# Patient Record
Sex: FEMALE | Race: WHITE | NOT HISPANIC OR LATINO | Employment: FULL TIME | URBAN - METROPOLITAN AREA
[De-identification: names, ages, dates, MRNs, and addresses within clinical notes are randomized per-mention and may not be internally consistent; named-entity substitution may affect disease eponyms.]

---

## 2021-04-06 DIAGNOSIS — Z23 ENCOUNTER FOR IMMUNIZATION: ICD-10-CM

## 2021-05-12 ENCOUNTER — TELEPHONE (OUTPATIENT)
Dept: NEUROLOGY | Facility: CLINIC | Age: 31
End: 2021-05-12

## 2021-05-12 ENCOUNTER — OFFICE VISIT (OUTPATIENT)
Dept: NEUROSURGERY | Facility: CLINIC | Age: 31
End: 2021-05-12
Payer: COMMERCIAL

## 2021-05-12 ENCOUNTER — TRANSCRIBE ORDERS (OUTPATIENT)
Dept: NEUROSURGERY | Facility: CLINIC | Age: 31
End: 2021-05-12

## 2021-05-12 VITALS
DIASTOLIC BLOOD PRESSURE: 74 MMHG | HEIGHT: 64 IN | TEMPERATURE: 98.4 F | RESPIRATION RATE: 16 BRPM | WEIGHT: 149 LBS | SYSTOLIC BLOOD PRESSURE: 110 MMHG | HEART RATE: 65 BPM | BODY MASS INDEX: 25.44 KG/M2

## 2021-05-12 DIAGNOSIS — Q28.3 CEREBRAL CAVERNOUS MALFORMATION: Primary | ICD-10-CM

## 2021-05-12 DIAGNOSIS — R90.0 INTRACRANIAL MASS: Primary | ICD-10-CM

## 2021-05-12 PROCEDURE — 99204 OFFICE O/P NEW MOD 45 MIN: CPT | Performed by: NEUROLOGICAL SURGERY

## 2021-05-12 NOTE — PROGRESS NOTES
Patient Id: Aroldo Moon is a 26 y o  female        Handedness: Right      Assessment/Plan:    Diagnoses and all orders for this visit:    Cerebral cavernous malformation  -     Ambulatory referral to Neurosurgery  -     Ambulatory referral to Neurology; Future  -     MRI brain with and without contrast; Future        Discussion Summary:   1  Right parietal occipital cavernous malformation, less than 1 cm  We discussed the diagnosis of cerebral cavernous malformations as well as the context of other vascular malformations  There is hemosiderin around this likely cavernous malformation without any evidence of acute hemorrhage  There is no significant mass effect  MRA negative for vascular lesion  Ultimately I believe that this is incidental to her symptoms  It does not explain her "tilting vision" or headaches  Given her age and new diagnosis of this lesion would like to repeat an MRI in 6 months with and without contrast to ensure stability  We discussed the surgical indications for resection including repeated hemorrhage and uncontrolled seizures  There is no evidence for either of these  2  Family history of aneurysm/subarachnoid hemorrhage in her grandmother  We discussed the natural history and diagnosis of aneurysms  Her MRA is negative at this time for intracranial aneurysm or vascular malformation  We discussed that aneurysm formation is a dynamic process  Negative MRA at this juncture does not preclude aneurysm formation in the future  I typically screened patient's once a decade  She can repeat an MRA in her 45s for further assessment  Chief Complaint: Consult      HPI:   This is a very pleasant 29-year-old female who has had intermittent symptoms of visual disturbance where she states that the vision is sliding from right to left  Sometimes she gets headaches and head pain which most recently can last up to 5 minutes  She denies any other neurologic deficit    She denies any significant history of migraines  She denies any other visual symptoms  She denies any black spots are longstanding visual changes  Denies any numbness tingling weakness  Her past medical history significant for Lyme disease  She is currently on doxycycline  No significant past surgical history  She is not allergic to any medications  She is   He has no children  She works as an    She denies any tobacco abuse  She drinks alcohol socially on the weekends  Denies any illicit drug use  Family history is significant for aneurysm in her grandmother  Review of systems obtained by the MA reviewed and updated below  Review of Systems   Constitutional: Negative  HENT: Negative  Eyes: Positive for visual disturbance (with vertigo or migraines)  Respiratory: Positive for shortness of breath (from stress)  Cardiovascular: Negative  Gastrointestinal: Negative  Endocrine: Negative  Genitourinary: Negative  Musculoskeletal: Positive for gait problem (unsteady at times)  Negative for back pain, myalgias and neck pain  Skin: Negative  Allergic/Immunologic: Negative  Neurological: Positive for dizziness (vertigo) and headaches (migraines)  Negative for tremors, seizures, speech difficulty, weakness, light-headedness and numbness  Hematological: Bruises/bleeds easily (bruise)  Psychiatric/Behavioral: Negative  Physical Exam  Vitals:    05/12/21 1117   BP: 110/74   Pulse: 65   Resp: 16   Temp: 98 4 °F (36 9 °C)   She is well appearing  Affect is appropriate  Body mass index is 25 58 kg/m²  Praveen Shawn She is awake alert and oriented  Hearing and vision are grossly intact  Her pupils are equal round reactive to light  Her extraocular movements are intact  Her face is symmetric  Tongue is midline  Facial sensation is intact and symmetric throughout  Shoulder shrug is 5/5  There is no drift or dysmetria       She has full strength in her bilateral upper and lower extremities  She has normal muscle tone muscle bulk  Her biceps reflexes and patellar reflexes are 2+ and symmetric  Riki sign negative bilaterally  Sensation intact to light touch and pinprick throughout  Her gait is normal      Her heart rate is regular  Her breath sounds are clear  2+ radial pulses no carotid bruit  The following portions of the patient's history were reviewed and updated as appropriate: allergies, current medications, past family history, past medical history, past social history, past surgical history and problem list     Active Ambulatory Problems     Diagnosis Date Noted    Cerebral cavernous malformation 05/12/2021     Resolved Ambulatory Problems     Diagnosis Date Noted    No Resolved Ambulatory Problems     No Additional Past Medical History       History reviewed  No pertinent surgical history  No current outpatient medications on file  Results/Data: We reviewed her imaging in detail as well as report

## 2021-05-12 NOTE — TELEPHONE ENCOUNTER
Spoke w Fara at [facility]'s while Pt with her  Best contact number for patient:      All demos confirmed    Emergency Contact name and number:    Referring provider and telephone number:     Dr Ramone Choe Provider Name and if affiliated with [facility]:       Krissy Bates    Reason for Appt:        Cerebral cavernous malformation     Have you seen and followed up with a pediatric Neurologist for this disease in the past?       NX wanted Illa Sidle only  (If yes ok to schedule with Dr Heath Virgen)    Neurology Location patient would like to be seen:     [facility] or [facility]  Order received? Yes                                                 Records Received? NX notes    Have you ever seen another Neurologist?         Not sure - didn't speak to pt - forgot to ask Fara to ask her    Insurance Information    Insurance Name:      [employer] PPO    ID/Policy #:    Secondary Insurance:    ID/Policy#: Workman's Comp/ Accident/ School  Information      Workman's Comp/Accident/School related?             NO    If yes name of Insurance company:    Claim #:    Date of Injury:    Type of Injury:     Name and Telephone Number:    Notes:                   Appointment date:     Tuesday 8/31/21  1300  Patrick      Sent NP HA packet     Put on WL for Erinn Ponkina in [facility] and [facility]

## 2021-05-18 ENCOUNTER — TELEPHONE (OUTPATIENT)
Dept: NEUROLOGY | Facility: CLINIC | Age: 31
End: 2021-05-18

## 2021-05-18 NOTE — TELEPHONE ENCOUNTER
ADD ON - patient is scheduled with Dr Sigird Brambila in [facility] on 5/19 @ 2p - insurance is still in force

## 2021-05-19 ENCOUNTER — CONSULT (OUTPATIENT)
Dept: NEUROLOGY | Facility: CLINIC | Age: 31
End: 2021-05-19
Payer: COMMERCIAL

## 2021-05-19 VITALS
HEIGHT: 64 IN | SYSTOLIC BLOOD PRESSURE: 101 MMHG | DIASTOLIC BLOOD PRESSURE: 59 MMHG | HEART RATE: 63 BPM | BODY MASS INDEX: 26.63 KG/M2 | WEIGHT: 156 LBS

## 2021-05-19 DIAGNOSIS — G43.009 MIGRAINE WITHOUT AURA AND WITHOUT STATUS MIGRAINOSUS, NOT INTRACTABLE: Primary | ICD-10-CM

## 2021-05-19 DIAGNOSIS — G44.85 STABBING HEADACHE: ICD-10-CM

## 2021-05-19 DIAGNOSIS — R42 VERTIGO: ICD-10-CM

## 2021-05-19 PROCEDURE — 99205 OFFICE O/P NEW HI 60 MIN: CPT | Performed by: PSYCHIATRY & NEUROLOGY

## 2021-05-19 NOTE — PATIENT INSTRUCTIONS
Consider ENT referral     Headache/migraine treatment:   Abortive medications (for immediate treatment of a headache): It is ok to take ibuprofen, acetaminophen or naproxen (Advil, Tylenol,  Aleve, Excedrin) if they help your headaches you should limit these to No more than 3 times a week to avoid medication overuse/rebound headaches  Over the counter preventive supplements for headaches/migraines (if you try, try for 3 months straight)  (to take every day to help prevent headaches - not to take at the time of headache):  [x] Magnesium 400mg daily (If any diarrhea or upset stomach, decrease dose  as tolerated)  [x] Riboflavin (Vitamin B2) 400mg daily - try online   (FYI B2 may make your urine bright/neon yellow)    Lifestyle Recommendations:  [x] SLEEP - Maintain a regular sleep schedule: Adults need at least 7-8 hours of uninterrupted a night  Maintain good sleep hygiene:  Going to bed and waking up at consistent times, avoiding excessive daytime naps, avoiding caffeinated beverages in the evening, avoid excessive stimulation in the evening and generally using bed primarily for sleeping  One hour before bedtime would recommend turning lights down lower, decreasing your activity (may read quietly, listen to music at a low volume)  When you get into bed, should eliminate all technology (no texting, emailing, playing with your phone, iPad or tablet in bed)  [x] HYDRATION - Maintain good hydration  Drink  2L of fluid a day (4 typical small water bottles)  [x] DIET - Maintain good nutrition  In particular don't skip meals and try and eat healthy balanced meals regularly  [x] TRIGGERS - Look for other triggers and avoid them: Limit caffeine to 1-2 cups a day or less  Avoid dietary triggers that you have noticed bring on your headaches (this could include aged cheese, peanuts, MSG, aspartame and nitrates)    [x] EXERCISE - physical exercise as we all know is good for you in many ways, and not only is good for your heart, but also is beneficial for your mental health, cognitive health and  chronic pain/headaches  I would encourage at the least 5 days of physical exercise weekly for at least 30 minutes  Education and Follow-up  [x] Please call with any questions or concerns  Of course if any new concerning symptoms go to the emergency department    [x] Follow up if needed

## 2021-05-19 NOTE — PROGRESS NOTES
[address] [facility] Consult  PATIENT:  Roxy Bianchi  MRN:  [id number]  :  1990  DATE OF SERVICE:  2021  REFERRED BY: Pieter Zaldivar MD  PMD: Negin Hammond DO    Assessment/Plan:     Roxy Bianchi is a very pleasant 26 y o  female with a past medical history that includes migraines, cavernous malformation, lyme 2021 (s/p doxycycline for 2 weeks), vertigo referred here for evaluation of headache  My initial evaluation 2021     Migraine without aura and without status migrainosus, not intractable  Stabbing headache  Vertigo/dizziness  She reports a long history of migraines dating back to age 11 although very infrequent severe migraines over the years  Migraines are diffuse pressure and fullness of the head without aura and with typical associated migrainous features  She reports mild headaches in the left frontal region lasting approximately 4-5 minutes that self-resolved typically without migrainous features  She reports occasional sharp pains in the right and center occipital region and  That last 5 seconds and self-resolve  She also reports for the past 7 years she has which she describes as vertigo where her vision seems to tilt to the left and then comes back within 5-10 seconds in she will feel slightly off balance  She reports this is not provoked by movements and at maximum lasted 4 minutes  Typically no headache associated with this although this has happened in the past with headache associated  Imaging showed incidental cavernous malformation for which she saw Neurosurgery and was referred to Neurology  - as of 2021: vertigo episodes about twice a week, not this week  Was more frequent over the past 6 months so sought evaluation and had imaging  Mild Headaches 3 times a week, 5 migraines in life (last was 3 years ago)     We discussed vertigo episodes certainly can come with a history of migraines although atypical   Certainly could have increased over the past months due to hormonal changes off of birth control since she plans to get pregnant soon  If were more frequent certainly could treat with migraine preventative to see if improved  Also on differential would be some sort of inner ear pathology for which she could see ENT to rule out other causes  Workup:  - Neurologic assessment reveals normal neurological exam   [place]-Hallpike negative for dizziness or nystagmus  Head impulse caused slight lightheadedness when turned head to left, more when turned head to right and triggered headache, no lag  Negative test of skew   -   MRI brain IAC with without contrast and MRA 5/7/21   1  A 1 cm cavernoma in right occipital lobe associated with small developmental venous anomaly  No surrounding vasogenic edema to suggest recent hemorrhage  2  Minimal cerebellar tonsillar ectopia  3  No mass or pathologic enhancement in internal auditory canals or cerebellopontine angles  No pathologic enhancement of inner ear structures  4  Widely patent intracranial arteries  -     Ambulatory Referral to Otolaryngology; Future    Preventative:  - we discussed headache hygiene and lifestyle factors that may improve headaches  -   No indication for prescription preventative at this time  Discussed she could consider headache preventative supplements if she would like with magnesium and riboflavin thought to be safe during pregnancy  - Past/ failed/contraindicated:  none  - future options after pregnancy:  Verapamil, gabapentin, amitriptyline,  Topiramate, CGRP med    Abortive:  - discussed not taking over-the-counter or prescription pain medications more than 3 days per week to prevent medication overuse/rebound headache  -   She currently takes Excedrin for migraines which she would have to stop during pregnancy    - Past/ failed/contraindicated:   OTC meds  - future options:   Could consider Triptan if migraines return, prochlorperazine, Toradol IM or p o , could consider trial for 5 days of Depakote or dexamethasone 4 prolonged migraine, ubrelvy, reyvow, nurtec      Patient instructions        Headache/migraine treatment:   Abortive medications (for immediate treatment of a headache): It is ok to take ibuprofen, acetaminophen or naproxen (Advil, Tylenol,  Aleve, Excedrin) if they help your headaches you should limit these to No more than 3 times a week to avoid medication overuse/rebound headaches  Over the counter preventive supplements for headaches/migraines (if you try, try for 3 months straight)  (to take every day to help prevent headaches - not to take at the time of headache):  [x] Magnesium 400mg daily (If any diarrhea or upset stomach, decrease dose  as tolerated)  [x] Riboflavin (Vitamin B2) 400mg daily - try online   (FYI B2 may make your urine bright/neon yellow)    Lifestyle Recommendations:  [x] SLEEP - Maintain a regular sleep schedule: Adults need at least 7-8 hours of uninterrupted a night  Maintain good sleep hygiene:  Going to bed and waking up at consistent times, avoiding excessive daytime naps, avoiding caffeinated beverages in the evening, avoid excessive stimulation in the evening and generally using bed primarily for sleeping  One hour before bedtime would recommend turning lights down lower, decreasing your activity (may read quietly, listen to music at a low volume)  When you get into bed, should eliminate all technology (no texting, emailing, playing with your phone, iPad or tablet in bed)  [x] HYDRATION - Maintain good hydration  Drink  2L of fluid a day (4 typical small water bottles)  [x] DIET - Maintain good nutrition  In particular don't skip meals and try and eat healthy balanced meals regularly  [x] TRIGGERS - Look for other triggers and avoid them: Limit caffeine to 1-2 cups a day or less   Avoid dietary triggers that you have noticed bring on your headaches (this could include aged cheese, peanuts, MSG, aspartame and nitrates)  [x] EXERCISE - physical exercise as we all know is good for you in many ways, and not only is good for your heart, but also is beneficial for your mental health, cognitive health and  chronic pain/headaches  I would encourage at the least 5 days of physical exercise weekly for at least 30 minutes  Education and Follow-up  [x] Please call with any questions or concerns  Of course if any new concerning symptoms go to the emergency department  [x] Follow up if needed         CC: We had the pleasure of evaluating Curtis Guzman in neurological consultation today  Curtis Guzman is a   right handed female who presents today for evaluation of headaches  History obtained from patient as well as available medical record review  History of Present Illness:   Current medical illnesses  or past medical history include migraines, cavernous malformation, lyme 4/2021 (doxycycline for 2 weeks), vertigo       Headaches started at what age? 11years old  How often do the headaches occur?   - migraine with visual symptoms at age 11   - 3 since then   - for the past 7 years can have vertigo, vision tilts to the left and then comes back within 5-10 seconds and feels like she might felt over - not provoked by movements  Max 4 minutes  No headache associated with this  - as of 5/19/2021: vertigo episodes about twice a week, not this week  Was more frequent over the past 6 months so sought evaluation and had imaging  Headaches 3 times a week, 5 migraines in life (last was 3 years ago)  What time of the day do the headaches start? No particular time of day   How long do the headaches last? Headaches 4-5 minutes   Vertigo 5-10 seconds up to 4 minutes   Are you ever headache free? Yes    Aura?  Without     Last eye exam: 12/2020 - just contacts     Where is your headache located and pain quality?   - headaches - left frontal the most but anywhere lasting 4-5 mins - usually doesn't have migrainous features    - migraines - diffuse, pressure fullness   - sharp pains - right occipital, mid back - lasts 5 seconds   What is the intensity of pain? Average: 5/10, worst 10/10  Associated symptoms: migrianes  [x] Nausea       [] Vomiting      [x] Photophobia     [x]Phonophobia      [] Osmophobia  [x] Prefer quiet, dark room  [x] Light-headed or dizzy      [] Tinnitus   [] Hands or feet tingle or feel numb/paresthesias      [] Ptosis      [] Facial droop  [] Lacrimation  [] Nasal congestion/rhinorrhea        Things that make the headache worse? No specific movements, any movement     Headache triggers:  Unknown      Have you seen someone else for headaches or pain? Yes, NSGY, PCP  Have you had trigger point injection performed and how often? No  Have you had Botox injection performed and how often? No   Have you had epidural injections or transforaminal injections performed? No  Are you current pregnant or planning on getting pregnant? Yes planning, stopped [facility] May or April   Have you ever had any Brain imaging? yes back at age 11 and age 26    What medications do you take or have you taken for your headaches?    ABORTIVE:      For migraines: exedrin     PREVENTIVE:   -      Past/ failed/contraindicated:  -    Alternative therapies used in the past for headaches? no    LIFESTYLE  Sleep   - averages: 7 5-8  Problems falling asleep?:   Yes  Problems staying asleep?:  Yes    Physical activity: 5 days a week - HIT    Water: 4 large water bottles per day  Caffeine: 2 cups per day    Mood: - generally anxiety  Denies history of anxiety or depression or other diagnosed mood disorder    The following portions of the patient's history were reviewed and updated as appropriate: allergies, current medications, past family history, past medical history, past social history, past surgical history and problem list      Pertinent family history:  Family history of headaches:  migraine headaches in mother  Any family history of aneurysms - grandma  of brain aneurysm around 79 something, Rochele Loser has since had MRA normal     Pertinent social history:  Work:  - in [facility], grew up here   Lives with  in [facility]     Past Medical History:   History reviewed  No pertinent past medical history  Patient Active Problem List   Diagnosis    Cerebral cavernous malformation       Medications:      No current outpatient medications on file  No current facility-administered medications for this visit           Allergies:    No Known Allergies    Family History:     Family History   Problem Relation Age of Onset    Hyperthyroidism Mother     Hyperlipidemia Father     Thyroid cancer Maternal Grandmother     Aneurysm Paternal Grandmother        Social History:       Social History     Socioeconomic History    Marital status: /Civil Union     Spouse name: Not on file    Number of children: Not on file    Years of education: Not on file    Highest education level: Not on file   Occupational History    Not on file   Social Needs    Financial resource strain: Not on file    Food insecurity     Worry: Not on file     Inability: Not on file   [employer] needs     Medical: Not on file     Non-medical: Not on file   Tobacco Use    Smoking status: Never Smoker    Smokeless tobacco: Never Used   Substance and Sexual Activity    Alcohol use: Yes     Comment: social    Drug use: Never    Sexual activity: Not on file   Lifestyle    Physical activity     Days per week: Not on file     Minutes per session: Not on file    Stress: Not on file   Relationships    Social connections     Talks on phone: Not on file     Gets together: Not on file     Attends Buddhism service: Not on file     Active member of club or organization: Not on file     Attends meetings of clubs or organizations: Not on file     Relationship status: Not on file    Intimate partner violence     Fear of current or ex partner: Not on file     Emotionally abused: Not on file     Physically abused: Not on file     Forced sexual activity: Not on file   Other Topics Concern    Not on file   Social History Narrative    Not on file         Objective:       Physical Exam:                                                                 Vitals:            Constitutional:    /59 (BP Location: Left arm, Patient Position: Sitting, Cuff Size: Standard)   Pulse 63   Ht 5' 4" (1 626 m)   Wt 70 8 kg (156 lb)   BMI 26 78 kg/m²   BP Readings from Last 3 Encounters:   05/19/21 101/59   05/12/21 110/74     Pulse Readings from Last 3 Encounters:   05/19/21 63   05/12/21 65         Well developed, well nourished, well groomed  No dysmorphic features  HEENT:  Normocephalic atraumatic  No meningismus  Oropharynx is clear and moist  No oral mucosal lesions  Chest:  Respirations regular and unlabored  Cardiovascular:  Regular rate, intact distal pulses  Distal extremities warm without palpable edema or tenderness, no observed significant swelling  Musculoskeletal:  Full range of motion  (see below under neurologic exam for evaluation of motor function and gait)   Skin:  warm and dry, not diaphoretic  No apparent birthmarks or stigmata of neurocutaneous disease  Psychiatric:  Normal behavior and appropriate affect        Neurological Examination:     Mental status/cognitive function:   Recent and remote memory intact  Attention span and concentration as well as fund of knowledge are appropriate for age  Normal language and spontaneous speech  Head impulse test - slight lightheadedness when turned head to left, more when turned head to right  Negative test of skew   Negative for dizziness or nystagmus with may hallpike    Cranial Nerves:  II-visual fields full  Fundi poorly visualized due to pupillary constriction  III, IV, VI-Pupils were equal, round, and reactive to light and accomodation  Extraocular movements were full and conjugate without nystagmus  V-facial sensation symmetric  VII-facial expression symmetric, intact forehead wrinkle, strong eye closure, symmetric smile    VIII-hearing grossly intact bilaterally   IX, X-palate elevation symmetric, no dysarthria  XI-shoulder shrug strength intact    XII-tongue protrusion midline  Motor Exam: symmetric bulk and tone throughout, no pronator drift  Power/strength 5/5 bilateral upper and lower extremities, no atrophy, fasciculations or abnormal movements noted  Sensory: grossly intact light touch in all extremities  Reflexes: brachioradialis 2+, biceps 2+, knee 2+, ankle 2+ bilaterally  No ankle clonus  Coordination: Finger nose finger intact bilaterally, no apparent dysmetria, ataxia or tremor noted  Gait: steady casual and tandem gait  Pertinent lab results:   4/14/21: BMP, CBC, TSH all normal   Anti thyroglobulin -   Anti-thyroid peroxidase-   Antimicrosomal ab -   Lyme was positive     Imaging:   -   MRI brain IAC with without contrast and MRA 5/7/21   1  A 1 cm cavernoma in right occipital lobe associated with small developmental venous anomaly  No surrounding vasogenic edema to suggest recent hemorrhage  2  Minimal cerebellar tonsillar ectopia  3  No mass or pathologic enhancement in internal auditory canals or cerebellopontine angles  No pathologic enhancement of inner ear structures  4  Widely patent intracranial arteries  Review of Systems:   ROS obtained by medical assistant Personally reviewed and updated if indicated  Review of Systems   Constitutional: Negative  Negative for appetite change and fever  HENT: Negative  Negative for hearing loss, tinnitus, trouble swallowing and voice change  Eyes: Positive for visual disturbance  Negative for photophobia and pain  Respiratory: Negative  Negative for shortness of breath  Cardiovascular: Negative  Negative for palpitations  Gastrointestinal: Negative  Negative for nausea and vomiting  Endocrine: Negative   Negative for cold intolerance  Genitourinary: Negative  Negative for dysuria, frequency and urgency  Musculoskeletal: Negative  Negative for myalgias and neck pain  Skin: Negative  Negative for rash  Neurological: Positive for dizziness and headaches  Negative for tremors, seizures, syncope, facial asymmetry, speech difficulty, weakness, light-headedness and numbness  Hematological: Negative  Does not bruise/bleed easily  Psychiatric/Behavioral: Negative  Negative for confusion, hallucinations and sleep disturbance  I have spent 60 minutes with Patient today in which greater than 50% of this time was spent in counseling/coordination of care regarding Diagnostic results, Prognosis, Intructions for management, Patienteducation and Impressions  I also spent 20 minutes non face to face for this patient the same day           Author:  Swathi Castellano MD 5/19/2021 2:53 PM

## 2021-05-19 NOTE — PROGRESS NOTES
Review of Systems   Constitutional: Negative  Negative for appetite change and fever  HENT: Negative  Negative for hearing loss, tinnitus, trouble swallowing and voice change  Eyes: Positive for visual disturbance  Negative for photophobia and pain  Respiratory: Negative  Negative for shortness of breath  Cardiovascular: Negative  Negative for palpitations  Gastrointestinal: Negative  Negative for nausea and vomiting  Endocrine: Negative  Negative for cold intolerance  Genitourinary: Negative  Negative for dysuria, frequency and urgency  Musculoskeletal: Negative  Negative for myalgias and neck pain  Skin: Negative  Negative for rash  Neurological: Positive for dizziness and headaches  Negative for tremors, seizures, syncope, facial asymmetry, speech difficulty, weakness, light-headedness and numbness  Hematological: Negative  Does not bruise/bleed easily  Psychiatric/Behavioral: Negative  Negative for confusion, hallucinations and sleep disturbance

## 2021-10-27 ENCOUNTER — TELEPHONE (OUTPATIENT)
Dept: NEUROSURGERY | Facility: CLINIC | Age: 31
End: 2021-10-27

## 2021-11-02 ENCOUNTER — TELEPHONE (OUTPATIENT)
Dept: NEUROSURGERY | Facility: CLINIC | Age: 31
End: 2021-11-02